# Patient Record
Sex: MALE | URBAN - METROPOLITAN AREA
[De-identification: names, ages, dates, MRNs, and addresses within clinical notes are randomized per-mention and may not be internally consistent; named-entity substitution may affect disease eponyms.]

---

## 2017-12-11 ENCOUNTER — RECORDS - HEALTHEAST (OUTPATIENT)
Dept: GENERAL RADIOLOGY | Facility: CLINIC | Age: 62
End: 2017-12-11

## 2017-12-11 ENCOUNTER — OFFICE VISIT - HEALTHEAST (OUTPATIENT)
Dept: FAMILY MEDICINE | Facility: CLINIC | Age: 62
End: 2017-12-11

## 2017-12-11 DIAGNOSIS — M54.6 PAIN IN THORACIC SPINE: ICD-10-CM

## 2017-12-11 DIAGNOSIS — M54.6 THORACIC BACK PAIN: ICD-10-CM

## 2017-12-11 DIAGNOSIS — M54.50 LOW BACK PAIN: ICD-10-CM

## 2017-12-11 DIAGNOSIS — M54.50 LOWER BACK PAIN: ICD-10-CM
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
## 2017-12-11 RX ORDER — LISINOPRIL 10 MG/1
TABLET ORAL
Refills: 3 | Status: SHIPPED | COMMUNITY
Start: 2017-11-10

## 2017-12-11 RX ORDER — METFORMIN HCL 500 MG
TABLET, EXTENDED RELEASE 24 HR ORAL
Refills: 11 | Status: SHIPPED | COMMUNITY
Start: 2017-11-18

## 2017-12-11 RX ORDER — HYDROCODONE BITARTRATE AND ACETAMINOPHEN 5; 325 MG/1; MG/1
1-2 TABLET ORAL EVERY 8 HOURS PRN
Qty: 12 TABLET | Refills: 0 | Status: SHIPPED | OUTPATIENT
Start: 2017-12-11

## 2017-12-11 ASSESSMENT — MIFFLIN-ST. JEOR: SCORE: 1425.77

## 2021-05-31 VITALS — BODY MASS INDEX: 29.06 KG/M2 | HEIGHT: 63 IN | WEIGHT: 164 LBS

## 2021-06-25 NOTE — PROGRESS NOTES
"Progress Notes by Joellen Conn MD at 12/11/2017  1:40 PM     Author: Joellen Conn MD Service: -- Author Type: Physician    Filed: 12/12/2017 12:21 PM Encounter Date: 12/11/2017 Status: Signed    : Joellen Conn MD (Physician)       OFFICE VISIT - FAMILY MEDICINE     ASSESSMENT AND PLAN     1. Lower back pain  XR Lumbar Spine 2 or 3 VWS    HYDROcodone-acetaminophen 5-325 mg per tablet   2. Fall, initial encounter     3. Thoracic back pain  XR Thoracic Spine 2 VWS    HYDROcodone-acetaminophen 5-325 mg per tablet   Fall with injury to the lumbar and thoracic area, mostly musculoskeletal, x-ray personally reviewed did not show any acute finding.  We've  discussed about management with ice or heat, anti-inflammatory as needed, he was also given hydrocodone to take 1 or 2 tablets at night only, possible side effect discussed with the patient include risk of addiction.  No plan on further refill, he will follow with his primary care physician for long-term management of his chronic issues.    CHIEF COMPLAINT   Fall (ICE, \"TWICE, 12/05, 12/09 - NEW PT); Back Pain; and Neck Pain    HPI   Iván Early is a 62 y.o. male.  Updated [facility] - Needs Colonoscopy  Patient fell on the ice a couple of times within a few days, has been complaining of back pain, is pointing primarily on his mid and lower back area, intensity is about 7-8 out of 10, described as a sharp sensation with no sign of radiculopathies.  Has been taking Advil 200 mg 3-4 pills 3-4 times a day with no improvement of his symptoms.  The patient decided to be seen after his second for because he thought that something may have been broken on his back.  Is having a hard time sleeping at night.  He usually gets his care at [facility].  He does have diabetes and hypertension.  Is also stating that he just came back from [place] a few days ago.      Review of Systems As per HPI, otherwise negative.    OBJECTIVE   BP " "138/76 (Patient Site: Right Arm)  Pulse 68  Temp 97.8  F (36.6  C)  Resp 13  Ht 5' 3.11\" (1.603 m)  Wt 164 lb (74.4 kg)  BMI 28.95 kg/m2  Physical Exam   Constitutional: He is oriented to person, place, and time. He appears well-developed and well-nourished.   HENT:   Head: Normocephalic and atraumatic.   Neck: Normal range of motion. Neck supple. No JVD present. No tracheal deviation present. No thyromegaly present.   Cardiovascular: Normal rate, regular rhythm, normal heart sounds and intact distal pulses.  Exam reveals no gallop and no friction rub.    No murmur heard.  Pulmonary/Chest: Effort normal and breath sounds normal. No respiratory distress. He has no wheezes. He has no rales.   Musculoskeletal: He exhibits no edema.        Lumbar back: He exhibits tenderness and spasm.        Back:    Lymphadenopathy:     He has no cervical adenopathy.   Neurological: He is alert and oriented to person, place, and time. Coordination normal.   Psychiatric: He has a normal mood and affect. Judgment and thought content normal.       [facility]   No family history on file.  Social History     Social History   ? Marital status:      Spouse name: N/A   ? Number of children: N/A   ? Years of education: N/A     Occupational History   ? Not on file.     Social History Main Topics   ? Smoking status: Former Smoker   ? Smokeless tobacco: Not on file   ? Alcohol use Not on file   ? Drug use: Not on file   ? Sexual activity: Not on file     Other Topics Concern   ? Not on file     Social History Narrative   ? No narrative on file     Relevant history was reviewed with the patient today, unless noted in HPI, nothing is pertinent for this visit.  [facility]   There are no active problems to display for this patient.    No past surgical history on file.    RESULTS/CONSULTS (Lab/Rad)   No results found for this or any previous visit (from the past 168 hour(s)).  Xr Thoracic Spine 2 Vws    Result Date: 12/11/2017  XR THORACIC SPINE 2 " VWS, XR LUMBAR SPINE 2 OR 3 VWS 12/11/2017 2:30 PM INDICATION: Fall, backache. COMPARISON: None. FINDINGS: Thoracic spine: Alignment unremarkable. No compression fracture. Shoulder artifact obscures evaluation of the upper thoracic spine. No aggressive osseous lesions. Mild/moderate multilevel degenerative disc disease loss of disc height and endplate osteophyte formation. Limited evaluation of the soft tissues of the thorax unremarkable. Limited evaluation of the ribs unremarkable. Lumbar spine: 5 lumbar type vertebral bodies. No compression fracture. No aggressive osseous lesions. No compression fracture. Mild degenerative changes with loss of disc height and endplate osteophyte formation. Mild facet arthropathy in lower lumbar spine. This report was electronically interpreted by: Dr. Rodrigo Paiz MD ON 12/11/2017 at 15:05    Xr Lumbar Spine 2 Or 3 Vws    Result Date: 12/11/2017  XR THORACIC SPINE 2 VWS, XR LUMBAR SPINE 2 OR 3 VWS 12/11/2017 2:30 PM INDICATION: Fall, backache. COMPARISON: None. FINDINGS: Thoracic spine: Alignment unremarkable. No compression fracture. Shoulder artifact obscures evaluation of the upper thoracic spine. No aggressive osseous lesions. Mild/moderate multilevel degenerative disc disease loss of disc height and endplate osteophyte formation. Limited evaluation of the soft tissues of the thorax unremarkable. Limited evaluation of the ribs unremarkable. Lumbar spine: 5 lumbar type vertebral bodies. No compression fracture. No aggressive osseous lesions. No compression fracture. Mild degenerative changes with loss of disc height and endplate osteophyte formation. Mild facet arthropathy in lower lumbar spine. This report was electronically interpreted by: Dr. Rodrigo Paiz MD ON 12/11/2017 at 15:05    MEDICATIONS     No current outpatient prescriptions on file prior to visit.     No current facility-administered medications on file prior to visit.        HEALTH MAINTENANCE /  SCREENING   PHQ-2 Total Score: 0 (12/11/2017  1:42 PM), No Data Recorded,No Data Recorded  Immunization History   Administered Date(s) Administered   ? DT (pediatric) 01/09/1990   ? Hep A, Adult IM (19yr & older) 08/04/2007, 03/04/2008   ? Influenza, inj, historic,unspecified 12/21/2013   ? Tdap 04/12/2014     Health Maintenance   Topic   ? ADVANCE DIRECTIVES DISCUSSED WITH PATIENT    ? COLONOSCOPY    ? ZOSTER VACCINE    ? INFLUENZA VACCINE RULE BASED (1)   ? TD 18+ HE    ? TDAP ADULT ONE TIME DOSE        Joellen Conn MD  Family Medicine, [facility]     This note was dictated using a voice recognition software.  Any grammatical or context distortion are unintentional and inherent to the software.